# Patient Record
Sex: FEMALE | Race: WHITE | NOT HISPANIC OR LATINO
[De-identification: names, ages, dates, MRNs, and addresses within clinical notes are randomized per-mention and may not be internally consistent; named-entity substitution may affect disease eponyms.]

---

## 2017-02-03 ENCOUNTER — APPOINTMENT (OUTPATIENT)
Dept: PULMONOLOGY | Facility: CLINIC | Age: 42
End: 2017-02-03

## 2017-02-03 VITALS
TEMPERATURE: 99.5 F | HEIGHT: 69 IN | SYSTOLIC BLOOD PRESSURE: 110 MMHG | OXYGEN SATURATION: 99 % | BODY MASS INDEX: 21.33 KG/M2 | WEIGHT: 144 LBS | DIASTOLIC BLOOD PRESSURE: 70 MMHG | HEART RATE: 85 BPM

## 2017-02-03 DIAGNOSIS — R04.2 HEMOPTYSIS: ICD-10-CM

## 2018-02-27 ENCOUNTER — FORM ENCOUNTER (OUTPATIENT)
Age: 43
End: 2018-02-27

## 2019-04-23 ENCOUNTER — FORM ENCOUNTER (OUTPATIENT)
Age: 44
End: 2019-04-23

## 2019-05-07 ENCOUNTER — FORM ENCOUNTER (OUTPATIENT)
Age: 44
End: 2019-05-07

## 2019-05-28 ENCOUNTER — FORM ENCOUNTER (OUTPATIENT)
Age: 44
End: 2019-05-28

## 2019-11-05 ENCOUNTER — APPOINTMENT (OUTPATIENT)
Dept: OBGYN | Facility: CLINIC | Age: 44
End: 2019-11-05
Payer: COMMERCIAL

## 2019-11-05 ENCOUNTER — TRANSCRIPTION ENCOUNTER (OUTPATIENT)
Age: 44
End: 2019-11-05

## 2019-11-05 VITALS
WEIGHT: 145 LBS | DIASTOLIC BLOOD PRESSURE: 70 MMHG | BODY MASS INDEX: 21.48 KG/M2 | SYSTOLIC BLOOD PRESSURE: 119 MMHG | HEIGHT: 69 IN

## 2019-11-05 DIAGNOSIS — Z78.9 OTHER SPECIFIED HEALTH STATUS: ICD-10-CM

## 2019-11-05 DIAGNOSIS — Z01.419 ENCOUNTER FOR GYNECOLOGICAL EXAMINATION (GENERAL) (ROUTINE) W/OUT ABNORMAL FINDINGS: ICD-10-CM

## 2019-11-05 DIAGNOSIS — K58.9 IRRITABLE BOWEL SYNDROME W/OUT DIARRHEA: ICD-10-CM

## 2019-11-05 DIAGNOSIS — E03.9 HYPOTHYROIDISM, UNSPECIFIED: ICD-10-CM

## 2019-11-05 DIAGNOSIS — Z87.09 PERSONAL HISTORY OF OTHER DISEASES OF THE RESPIRATORY SYSTEM: ICD-10-CM

## 2019-11-05 DIAGNOSIS — E72.12 METHYLENETETRAHYDROFOLATE REDUCTASE DEFICIENCY: ICD-10-CM

## 2019-11-05 DIAGNOSIS — N84.0 POLYP OF CORPUS UTERI: ICD-10-CM

## 2019-11-05 PROCEDURE — 99386 PREV VISIT NEW AGE 40-64: CPT

## 2019-11-05 NOTE — HISTORY OF PRESENT ILLNESS
[Definite:  ___ (Date)] : the last menstrual period was [unfilled] [Normal Amount/Duration] : was of a normal amount and duration [Spotting Between  Menses] : no spotting between menses [Regular Cycle Intervals] : periods have been regular [Frequency: Q ___ days] : menstrual periods occur approximately every [unfilled] days [Menarche Age: ____] : age at menarche was [unfilled] [Menstrual Cramps] : no menstrual cramps [Currently In Menopause] : not currently in menopause [Experiencing Menopausal Sxs] : not experiencing menopausal symptoms [Sexually Active] : is sexually active [Monogamous] : is monogamous [Male ___] : [unfilled] male

## 2019-11-06 LAB
C TRACH RRNA SPEC QL NAA+PROBE: NOT DETECTED
N GONORRHOEA RRNA SPEC QL NAA+PROBE: NOT DETECTED
SOURCE AMPLIFICATION: NORMAL

## 2020-07-09 ENCOUNTER — FORM ENCOUNTER (OUTPATIENT)
Age: 45
End: 2020-07-09

## 2020-08-30 ENCOUNTER — FORM ENCOUNTER (OUTPATIENT)
Age: 45
End: 2020-08-30

## 2021-05-04 ENCOUNTER — NON-APPOINTMENT (OUTPATIENT)
Age: 46
End: 2021-05-04

## 2022-01-12 PROBLEM — Z80.3 FAMILY HISTORY OF BREAST CANCER: Status: ACTIVE | Noted: 2022-01-12

## 2022-01-24 ENCOUNTER — APPOINTMENT (OUTPATIENT)
Dept: BREAST CENTER | Facility: CLINIC | Age: 47
End: 2022-01-24
Payer: COMMERCIAL

## 2022-01-24 ENCOUNTER — NON-APPOINTMENT (OUTPATIENT)
Age: 47
End: 2022-01-24

## 2022-01-24 VITALS
HEIGHT: 69 IN | HEART RATE: 65 BPM | BODY MASS INDEX: 23.7 KG/M2 | DIASTOLIC BLOOD PRESSURE: 72 MMHG | WEIGHT: 160 LBS | SYSTOLIC BLOOD PRESSURE: 108 MMHG

## 2022-01-24 DIAGNOSIS — R92.8 OTHER ABNORMAL AND INCONCLUSIVE FINDINGS ON DIAGNOSTIC IMAGING OF BREAST: ICD-10-CM

## 2022-01-24 DIAGNOSIS — Z80.3 FAMILY HISTORY OF MALIGNANT NEOPLASM OF BREAST: ICD-10-CM

## 2022-01-24 PROCEDURE — 99214 OFFICE O/P EST MOD 30 MIN: CPT

## 2022-02-08 ENCOUNTER — NON-APPOINTMENT (OUTPATIENT)
Age: 47
End: 2022-02-08

## 2022-02-08 DIAGNOSIS — D05.12 INTRADUCTAL CARCINOMA IN SITU OF LEFT BREAST: ICD-10-CM

## 2022-02-09 RX ORDER — LORAZEPAM 0.5 MG/1
0.5 TABLET ORAL DAILY
Qty: 1 | Refills: 0 | Status: ACTIVE | COMMUNITY
Start: 2022-02-09 | End: 1900-01-01

## 2022-02-16 ENCOUNTER — APPOINTMENT (OUTPATIENT)
Dept: HEMATOLOGY ONCOLOGY | Facility: CLINIC | Age: 47
End: 2022-02-16

## 2022-02-16 NOTE — DISCUSSION/SUMMARY
[FreeTextEntry1] : REASON FOR CONSULT\par Gayatri Mejia is a 46-year-old female referred by Dr. Kasia Khan for cancer genetic counseling and risk assessment due to a new diagnosis of breast cancer. Ms. Mejia was seen on 2022 at which time medical and family history was ascertained and a pedigree constructed. She was accompanied by her , Cedric.\par \par RELEVANT MEDICAL HISTORY\par Ms. Mejia was diagnosed with left breast cancer in  at the age of 46. 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 Surgery is not yet scheduled as results from breast MRI and genetic testing will help determine surgical approach.\par \par OTHER MEDICAL AND SURGICAL HISTORY:\par •	Medical History: infertility, IBS\par •	Surgical History: tonsillectomy, egg retrieval, uterine polypectomy\par \par OB/GYN HISTORY:\par Obstetrical History:  (patient’s daughter conceived using donor eggs)\par Age at Menarche: 16\par Menopausal Status: Premenopausal \par Age at First Live Birth: 46\par Oral Contraceptive Use: Yes, 2-3 years\par Hormone Replacement Therapy: No\par \par CANCER SCREENING HISTORY:  \par Breast: \par •	Mammography: 22- rec L biopsy\par •	Sonography: 22-rec L biopsy\par •	MRI: scheduled for today, \par GYN:\par •	Pelvic Examination: Annual- history of infertility, uterine polyps\par Colon:\par •	Colonoscopy: 2 years ago d/t IBS- reported 1 polyp identified (pathology unknown)\par Skin:  \par •	FBSE: Yes\par •	Lesions biopsied/removed: Yes- torso (reportedly pre-cancerous)\par \par SOCIAL HISTORY:\par •	Tobacco-product use: Yes, former- occasionally in college\par •	Environmental exposures: No\par \par FAMILY HISTORY:\par Maternal ancestry was reported as English/Yemeni/Tristanian and paternal ancestry was reported as English/Tristanian/[place]i. Ashkenazi Taoism ancestry was denied. A detailed family history of cancer was ascertained, see below and scanned chart for pedigree. \par \par 	Of note, Ms. Mejia reported her mother was adopted so she has limited health information on maternal relatives but does have a relationship with her mother’s biological sister who was able to provide some information. \par 	\par 	This maternal aunt was diagnosed with breast cancer at the age of 76 and pursued genetic testing using [employer] [employer]’s [facility] panel in 2020. She tested negative for any clinicially significant variants or variants of uncertain significance in 34 genes (report reviewed and confirmed). \par \par According to Ms. Mejia no one else in the family has had germline testing for cancer susceptibility. Consanguinity was denied. \par 	\par RISK ASSESSMENT:\par Ms. Mejia’s personal and family history is suggestive of a hereditary cancer syndrome given her history of breast cancer at age 46, maternal aunt’s history of breast cancer, maternal grandfather’s history of brain cancer in his early 40s, and extended paternal relatives with breast cancer. The patient meets National Comprehensive Cancer Network (NCCN) criteria for genetic testing. Given that she plans to make surgical decisions based on results from genetic testing, we recommended the [facility] Breast STAT Panel testing for genes associated with breast cancer (typically results within 5-12 days). This test analyzes 9 genes: LEONARD, BRCA1, BRCA2, CDH1, CHEK2, PALB2, PTEN, STK11, and TP53.\par \par The risks, benefits and limitations of genetic testing were discussed with Ms. Mejia. In addition, we discussed the purpose of genetic testing and possible test results (positive, negative, inconclusive) along with associated medical management options and psychosocial implications. Insurance coverage and potential out of pocket costs were also discussed. with written information regarding AMAURY. \par \par It was explained that risk assessment is based upon medical and family history as provided and may change in the future should new information be obtained. \par \par Following our discussion, Ms. Mejia consented to the above-mentioned genetic testing panel. Blood was drawn in our laboratory and sent to [facility] today.\par \par PLAN:\par \par 1.	Blood drawn today will be sent to [facility] for analysis. \par 2.	We will contact Ms. Mejia to schedule a follow-up appointment once the results are available. Results from the STAT panel generally return in 5-12 days. \par \par For any additional questions please call Cancer Genetics at [phone]. \par \par \par Lizeth Tapia MS, [facility] [place]\par Genetic Counselor, Cancer Genetics\par \par \par CC: \par Kasia Khan MD\par \par \par \par

## 2022-02-18 RX ORDER — LORAZEPAM 1 MG/1
1 TABLET ORAL
Qty: 2 | Refills: 0 | Status: ACTIVE | COMMUNITY
Start: 2022-02-18 | End: 1900-01-01

## 2022-02-24 ENCOUNTER — NON-APPOINTMENT (OUTPATIENT)
Age: 47
End: 2022-02-24

## 2022-03-03 ENCOUNTER — NON-APPOINTMENT (OUTPATIENT)
Age: 47
End: 2022-03-03

## 2022-03-03 NOTE — DISCUSSION/SUMMARY
[FreeTextEntry1] : REASON FOR CONSULT\par Gayatri Mejia is a 46-year-old female who was contacted on March 3, 2022 for a discussion regarding her negative genetic testing results related to hereditary cancer predisposition. This session was conducted via telephone. \par \par Ms. Mejia was originally seen by the Cancer Genetics Service on February 16, 2022 for hereditary cancer predisposition risk assessment due to a new diagnosis of breast cancer. At that time, Ms. Mejia decided to pursue genetic testing using [facility]’s breast STAT panel.\par \par INTERVAL HISTORY\par Upon completion of the STAT panel, Ms. Mejia was re-contacted on February 24, 2022 regarding her results and consented to pursue additional genetic testing for genes associated with breast and gynecological cancer not included on initial panel.\par \par TEST RESULTS: NEGATIVE\par NO pathogenic (disease-causing) variants or variants of uncertain significance were detected in any of the following genes [19]:  LEONARD, BARD1, BRCA1, BRCA2, BRIP1, CDH1, CHEK2, EPCAM, MLH1, MSH2, MSH6, NF1, PALB2, PMS2, PTEN, RAD51C, RAD51D, STK11, and TP53.\par \par RESULTS INTERPRETATION AND ASSESSMENT:\par Given Ms. Mejia’ personal and current reported family history of cancer, and her negative genetic test results, the following screening guidelines and risk-reducing recommendations were discussed:\par \par BREAST: \par •	It was discussed Ms. Mejia’ surgical plan should not be impacted by these negative genetic testing results.\par •	Long-term management and surveillance should be based on Ms. Mejia’s on- or post-treatment protocol as recommended by her breast care team. \par \par OTHER:\par •	In the absence of other indications, Ms. Mejia should practice age-appropriate cancer screening of other organ systems as recommended for the general population.\par \par \par We also discussed the limitations of negative results:\par 1.	The cause of Ms. Mejia’ personal and family history of cancer remains unknown. The cancer(s) may have developed randomly, or due to environmental factors.  \par 2.	This negative result does not completely rule out a hereditary basis for the reported personal and/or family history due to limitations in technology or a variant being present in an unidentified gene. \par 3.	Variants in other genes would not be identified by this analysis, so this negative result does not rule out the likelihood of having a mutation in a different hereditary cancer gene or the possibility of ever developing cancer.\par 4.	It is possible there is a hereditary cancer predisposition gene mutation in the family, but the patient did not inherit it. \par \par We informed Ms. Mejia that our knowledge of genetics and inherited cancer conditions is changing rapidly. Therefore, we recommended that Ms. Mejia contact our office, every 2 to 3 years, to discuss relevant advances in cancer genetics.  We emphasized the importance of re-contacting us with updates regarding her personal and family history of cancer as well as any updates regarding additional cancer genetic test results performed for the patient and/or family members.  Such updates could possibly change our risk assessment and recommendations. \par \par PLAN:\par 1.	These results do not change Ms. Mejia’ medical management. Long-term management and surveillance should be based on the patient’s on- or post-treatment protocol as recommended by her breast care team (and general population guidelines for other cancers).\par 2.	Patient informed consult note(s) will be available through their [facility] patient portal and genetic test results will be released via [facility]’s portal.\par 3.	Ms. Mejia was encouraged to contact us every 2-3 years to discuss relevant advances in cancer genetics, or sooner if there are any changes in her personal or family history of cancer.\par \par \par For any additional questions please call Cancer Genetics at [phone]. \par \par \par Lizeth Tapia MS, [facility] [place]\par Genetic Counselor, Cancer Genetics\par \par \par \par \par \par \par

## 2022-03-09 ENCOUNTER — NON-APPOINTMENT (OUTPATIENT)
Age: 47
End: 2022-03-09

## 2022-06-22 ENCOUNTER — NON-APPOINTMENT (OUTPATIENT)
Age: 47
End: 2022-06-22

## 2023-01-26 ENCOUNTER — APPOINTMENT (OUTPATIENT)
Dept: BREAST CENTER | Facility: CLINIC | Age: 48
End: 2023-01-26